# Patient Record
Sex: MALE | Race: WHITE | NOT HISPANIC OR LATINO | URBAN - METROPOLITAN AREA
[De-identification: names, ages, dates, MRNs, and addresses within clinical notes are randomized per-mention and may not be internally consistent; named-entity substitution may affect disease eponyms.]

---

## 2020-07-25 ENCOUNTER — TELEPHONE ENCOUNTER (OUTPATIENT)
Dept: URBAN - METROPOLITAN AREA CLINIC 13 | Facility: CLINIC | Age: 35
End: 2020-07-25

## 2020-07-26 ENCOUNTER — TELEPHONE ENCOUNTER (OUTPATIENT)
Dept: URBAN - METROPOLITAN AREA CLINIC 13 | Facility: CLINIC | Age: 35
End: 2020-07-26

## 2021-02-24 ENCOUNTER — CLAIMS CREATED FROM THE CLAIM WINDOW (OUTPATIENT)
Dept: URBAN - METROPOLITAN AREA MEDICAL CENTER 43 | Facility: MEDICAL CENTER | Age: 36
End: 2021-02-24
Payer: COMMERCIAL

## 2021-02-24 ENCOUNTER — TELEPHONE ENCOUNTER (OUTPATIENT)
Dept: URBAN - METROPOLITAN AREA CLINIC 113 | Facility: CLINIC | Age: 36
End: 2021-02-24

## 2021-02-24 DIAGNOSIS — R13.10 DYSPHAGIA, UNSPECIFIED: ICD-10-CM

## 2021-02-24 DIAGNOSIS — R13.19 CERVICAL DYSPHAGIA: ICD-10-CM

## 2021-02-24 DIAGNOSIS — T18.9XXA FOREIGN BODY OF ALIMENTARY TRACT, PART UNSPECIFIED, INITIAL ENCOUNTER: ICD-10-CM

## 2021-02-24 DIAGNOSIS — M54.2 CERVICALGIA: ICD-10-CM

## 2021-02-24 DIAGNOSIS — T18.128A FOOD IN ESOPHAGUS CAUSING OTHER INJURY, INITIAL ENCOUNTER: ICD-10-CM

## 2021-02-24 DIAGNOSIS — K20.0 EOSINOPHILIC ESOPHAGITIS: ICD-10-CM

## 2021-02-24 DIAGNOSIS — K31.89 ACQUIRED DEFORMITY OF DUODENUM: ICD-10-CM

## 2021-02-24 DIAGNOSIS — K22.2 ESOPHAGEAL OBSTRUCTION: ICD-10-CM

## 2021-02-24 DIAGNOSIS — K20.90 ESOPHAGITIS, UNSPECIFIED WITHOUT BLEEDING: ICD-10-CM

## 2021-02-24 PROCEDURE — 99284 EMERGENCY DEPT VISIT MOD MDM: CPT | Performed by: INTERNAL MEDICINE

## 2021-02-24 PROCEDURE — 43247 EGD REMOVE FOREIGN BODY: CPT | Performed by: INTERNAL MEDICINE

## 2021-03-23 ENCOUNTER — OFFICE VISIT (OUTPATIENT)
Dept: URBAN - METROPOLITAN AREA CLINIC 107 | Facility: CLINIC | Age: 36
End: 2021-03-23

## 2021-04-01 ENCOUNTER — OFFICE VISIT (OUTPATIENT)
Dept: URBAN - METROPOLITAN AREA CLINIC 107 | Facility: CLINIC | Age: 36
End: 2021-04-01
Payer: COMMERCIAL

## 2021-04-01 VITALS
HEART RATE: 62 BPM | RESPIRATION RATE: 18 BRPM | SYSTOLIC BLOOD PRESSURE: 151 MMHG | BODY MASS INDEX: 24.77 KG/M2 | HEIGHT: 74 IN | DIASTOLIC BLOOD PRESSURE: 96 MMHG | WEIGHT: 193 LBS | TEMPERATURE: 98.2 F

## 2021-04-01 DIAGNOSIS — K20.0 EOSINOPHILIC ESOPHAGITIS: ICD-10-CM

## 2021-04-01 DIAGNOSIS — R13.10 ESOPHAGEAL DYSPHAGIA: ICD-10-CM

## 2021-04-01 DIAGNOSIS — K22.2 ESOPHAGEAL OBSTRUCTION: ICD-10-CM

## 2021-04-01 DIAGNOSIS — T18.128A FOOD IN ESOPHAGUS CAUSING OTHER INJURY, INITIAL ENCOUNTER: ICD-10-CM

## 2021-04-01 PROBLEM — 58849003: Status: ACTIVE | Noted: 2021-04-01

## 2021-04-01 PROBLEM — 405247003: Status: ACTIVE | Noted: 2021-04-01

## 2021-04-01 PROCEDURE — 99214 OFFICE O/P EST MOD 30 MIN: CPT | Performed by: INTERNAL MEDICINE

## 2021-04-01 RX ORDER — PANTOPRAZOLE SODIUM 40 MG/1
1 TABLET TABLET, DELAYED RELEASE ORAL TWICE A DAY
Qty: 180 TABLET | Refills: 1

## 2021-04-01 RX ORDER — PANTOPRAZOLE SODIUM 40 MG/1
1 TABLET TABLET, DELAYED RELEASE ORAL TWICE A DAY
Status: ACTIVE | COMMUNITY

## 2021-04-01 NOTE — HPI-TODAY'S VISIT:
Mr. Nolasco is a 35-year-old male here for hospital follow-up after recent food impaction in February.  EGD performed 2/24/21 revealed food in the mid esophagus status post successful removal, mild mid esophageal stenosis and segmental esophagitis, observed only in mild patchy gastric erythema and normal duodenum.  he was producing diagnosed with eosinophilic esophagitis during a prior food impaction in 2019.  He was discharged home on Protonix twice daily.  Overall his dysphagia has improved, but not resolved.  He is very careful with what he eats.  He is not following any elimination diet, but does not eat or drink, seafood, nuts, wheat or soy that often.   he does have occasional postprandial urgency and looser stools.  He denies abdominal pain, chest pain, asthma, nausea, vomiting, change in bowel habits, blood in the stool or weight loss.

## 2021-04-02 ENCOUNTER — TELEPHONE ENCOUNTER (OUTPATIENT)
Dept: URBAN - METROPOLITAN AREA CLINIC 113 | Facility: CLINIC | Age: 36
End: 2021-04-02

## 2021-04-02 RX ORDER — PANTOPRAZOLE SODIUM 40 MG/1
1 TABLET TABLET, DELAYED RELEASE ORAL ONCE A DAY
Qty: 90 TABLET | Refills: 1

## 2021-08-02 ENCOUNTER — LAB OUTSIDE AN ENCOUNTER (OUTPATIENT)
Dept: URBAN - METROPOLITAN AREA CLINIC 107 | Facility: CLINIC | Age: 36
End: 2021-08-02

## 2021-10-27 ENCOUNTER — OFFICE VISIT (OUTPATIENT)
Dept: URBAN - METROPOLITAN AREA CLINIC 107 | Facility: CLINIC | Age: 36
End: 2021-10-27
Payer: COMMERCIAL

## 2021-10-27 ENCOUNTER — WEB ENCOUNTER (OUTPATIENT)
Dept: URBAN - METROPOLITAN AREA CLINIC 107 | Facility: CLINIC | Age: 36
End: 2021-10-27

## 2021-10-27 VITALS
TEMPERATURE: 98.5 F | RESPIRATION RATE: 18 BRPM | SYSTOLIC BLOOD PRESSURE: 155 MMHG | BODY MASS INDEX: 25.41 KG/M2 | WEIGHT: 198 LBS | HEIGHT: 74 IN | DIASTOLIC BLOOD PRESSURE: 102 MMHG | HEART RATE: 80 BPM

## 2021-10-27 DIAGNOSIS — R10.12 LEFT UPPER QUADRANT ABDOMINAL PAIN: ICD-10-CM

## 2021-10-27 DIAGNOSIS — R19.4 CHANGE IN BOWEL HABITS: ICD-10-CM

## 2021-10-27 DIAGNOSIS — R13.10 ESOPHAGEAL DYSPHAGIA: ICD-10-CM

## 2021-10-27 DIAGNOSIS — K21.9 GERD: ICD-10-CM

## 2021-10-27 DIAGNOSIS — K20.0 EOSINOPHILIC ESOPHAGITIS: ICD-10-CM

## 2021-10-27 PROCEDURE — 99214 OFFICE O/P EST MOD 30 MIN: CPT | Performed by: NURSE PRACTITIONER

## 2021-10-27 RX ORDER — DICYCLOMINE HYDROCHLORIDE 10 MG/1
1 CAPSULE CAPSULE ORAL
Qty: 60 | Refills: 1 | OUTPATIENT
Start: 2021-10-27 | End: 2021-12-25

## 2021-10-27 RX ORDER — PANTOPRAZOLE SODIUM 40 MG/1
1 TABLET TABLET, DELAYED RELEASE ORAL TWICE DAILY
Refills: 1 | Status: ACTIVE | COMMUNITY

## 2021-10-27 RX ORDER — PANTOPRAZOLE SODIUM 40 MG/1
1 TABLET TABLET, DELAYED RELEASE ORAL TWICE DAILY
OUTPATIENT

## 2021-10-27 NOTE — HPI-OTHER HISTORIES
EGD performed 2/24/21 revealed food in the mid esophagus status post successful removal, mild mid esophageal stenosis and segmental esophagitis, observed only in mild patchy gastric erythema and normal duodenum.  He was discharged home on Protonix twice daily.  He was previously diagnosed with eosinophilic esophagitis following a food impaction in 2019.

## 2021-10-27 NOTE — HPI-TODAY'S VISIT:
Mr. Nolasco is a 35-year-old male with a history of eosinophilic esophagitis presenting for evaluation of GERD and difficulty swallowing.  He was last seen 4/1/21 for follow-up after hospitalization for a food impaction. He symptoms had improved though not resolved on twice daily Protonix. He was recommended a 6 food elimination diet for EoE, with plan for repeat EGD in 6 months for possible dilation. Within the last 2 to 3 weeks, he has experienced increasing reflux symptoms and difficulty swallowing.  With most meals, food feels like it moves slowly through the esophagus and may become hung in his chest.  He has been following a soft and mostly liquid diet.  He complains of heartburn and indigestion following most oral intake despite twice daily Protonix and has experienced 1 or 2 episodes of regurgitation. He also complains of a more than 6-month history of intermittent left-sided abdominal pain.  He describes the discomfort as sharp in nature, but states it is not related to eating, bowel movements, or activity.  He complains of excessive gas and postprandial stool urgency.  He has 1-4 nonbloody stools per day, but consistency varies.  He is not on a bowel regimen. His mother had colon cancer, diagnosed in her early 50s.

## 2021-11-05 ENCOUNTER — OFFICE VISIT (OUTPATIENT)
Dept: URBAN - METROPOLITAN AREA SURGERY CENTER 25 | Facility: SURGERY CENTER | Age: 36
End: 2021-11-05
Payer: COMMERCIAL

## 2021-11-05 DIAGNOSIS — R10.12 LEFT UPPER QUADRANT ABDOMINAL PAIN: ICD-10-CM

## 2021-11-05 DIAGNOSIS — K22.70 BARRETT ESOPHAGUS: ICD-10-CM

## 2021-11-05 DIAGNOSIS — K20.90 ESOPHAGITIS DETERMINED BY BIOPSY: ICD-10-CM

## 2021-11-05 PROCEDURE — G8907 PT DOC NO EVENTS ON DISCHARG: HCPCS | Performed by: INTERNAL MEDICINE

## 2021-11-05 PROCEDURE — 43239 EGD BIOPSY SINGLE/MULTIPLE: CPT | Performed by: INTERNAL MEDICINE

## 2021-11-05 RX ORDER — PANTOPRAZOLE SODIUM 40 MG/1
1 TABLET TABLET, DELAYED RELEASE ORAL TWICE DAILY
Status: ACTIVE | COMMUNITY

## 2021-11-05 RX ORDER — DICYCLOMINE HYDROCHLORIDE 10 MG/1
1 CAPSULE CAPSULE ORAL
Qty: 60 | Refills: 1 | Status: ACTIVE | COMMUNITY
Start: 2021-10-27 | End: 2021-12-25

## 2021-11-05 RX ORDER — DICYCLOMINE HYDROCHLORIDE 10 MG/1
1 CAPSULE CAPSULE ORAL
Qty: 60 | Refills: 1 | OUTPATIENT

## 2021-11-05 RX ORDER — PANTOPRAZOLE SODIUM 40 MG/1
1 TABLET TABLET, DELAYED RELEASE ORAL TWICE DAILY
OUTPATIENT

## 2021-12-14 ENCOUNTER — ERX REFILL RESPONSE (OUTPATIENT)
Dept: URBAN - METROPOLITAN AREA CLINIC 107 | Facility: CLINIC | Age: 36
End: 2021-12-14

## 2021-12-14 RX ORDER — PANTOPRAZOLE SODIUM 40 MG/1
1 TABLET TABLET, DELAYED RELEASE ORAL TWICE A DAY
Qty: 180 TABLET | Refills: 1 | OUTPATIENT

## 2021-12-14 RX ORDER — PANTOPRAZOLE SODIUM 40 MG/1
TAKE 1 TABLET BY MOUTH TWICE A DAY TABLET, DELAYED RELEASE ORAL
Qty: 60 TABLET | Refills: 6 | OUTPATIENT

## 2021-12-20 ENCOUNTER — OFFICE VISIT (OUTPATIENT)
Dept: URBAN - METROPOLITAN AREA CLINIC 107 | Facility: CLINIC | Age: 36
End: 2021-12-20
Payer: COMMERCIAL

## 2021-12-20 VITALS
WEIGHT: 193 LBS | SYSTOLIC BLOOD PRESSURE: 146 MMHG | BODY MASS INDEX: 24.77 KG/M2 | HEART RATE: 68 BPM | TEMPERATURE: 98.2 F | RESPIRATION RATE: 18 BRPM | DIASTOLIC BLOOD PRESSURE: 95 MMHG | HEIGHT: 74 IN

## 2021-12-20 DIAGNOSIS — K21.9 GERD: ICD-10-CM

## 2021-12-20 DIAGNOSIS — K20.0 EOSINOPHILIC ESOPHAGITIS: ICD-10-CM

## 2021-12-20 DIAGNOSIS — R13.10 ESOPHAGEAL DYSPHAGIA: ICD-10-CM

## 2021-12-20 DIAGNOSIS — R10.12 LEFT UPPER QUADRANT ABDOMINAL PAIN: ICD-10-CM

## 2021-12-20 DIAGNOSIS — K22.70 LONG-SEGMENT BARRETT'S ESOPHAGUS: ICD-10-CM

## 2021-12-20 DIAGNOSIS — R19.4 CHANGE IN BOWEL HABITS: ICD-10-CM

## 2021-12-20 PROBLEM — 235599003: Status: ACTIVE | Noted: 2021-04-01

## 2021-12-20 PROBLEM — 235595009 GASTROESOPHAGEAL REFLUX DISEASE: Status: ACTIVE | Noted: 2021-10-27

## 2021-12-20 PROBLEM — 459491000124104: Status: ACTIVE | Noted: 2021-12-20

## 2021-12-20 PROBLEM — 88111009 CHANGE IN BOWEL HABIT: Status: ACTIVE | Noted: 2021-12-20

## 2021-12-20 PROBLEM — 40890009: Status: ACTIVE | Noted: 2021-04-01

## 2021-12-20 PROBLEM — 301715003 LEFT UPPER QUADRANT PAIN: Status: ACTIVE | Noted: 2021-12-20

## 2021-12-20 PROCEDURE — 99213 OFFICE O/P EST LOW 20 MIN: CPT | Performed by: INTERNAL MEDICINE

## 2021-12-20 RX ORDER — PANTOPRAZOLE SODIUM 40 MG/1
TAKE 1 TABLET BY MOUTH TWICE A DAY TABLET, DELAYED RELEASE ORAL
Qty: 60 TABLET | Refills: 6 | Status: ACTIVE | COMMUNITY

## 2021-12-20 RX ORDER — DICYCLOMINE HYDROCHLORIDE 10 MG/1
1 CAPSULE CAPSULE ORAL
Qty: 60 | Refills: 1 | Status: ON HOLD | COMMUNITY

## 2021-12-20 RX ORDER — PANTOPRAZOLE SODIUM 40 MG/1
1 TABLET TABLET, DELAYED RELEASE ORAL TWICE DAILY
OUTPATIENT

## 2021-12-20 NOTE — HPI-TODAY'S VISIT:
Mr. Nolasco is a 36-year-old male with a history of eosinophilic esophagitis presenting for EGD follow up for evaluation of GERD and difficulty swallowing.  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  Esophageal biopsies in 3 bottles revealed Farley's esophagus, negative for dysplasia and negative for eosinophils in the distal biopsies and the mid to upper esophageal biopsies revealed squamous mucosa with no significant histopathologic changes and specifically no eosinophils.  Overall his dysphasia has resolved and is trying to follow an eosinophil to esophagitis diet.  He is avoiding wheat, soy, milk and eggs.  He is on protonix twice daily.

## 2022-11-19 ENCOUNTER — ERX REFILL RESPONSE (OUTPATIENT)
Dept: URBAN - METROPOLITAN AREA CLINIC 107 | Facility: CLINIC | Age: 37
End: 2022-11-19

## 2022-11-19 RX ORDER — PANTOPRAZOLE SODIUM 40 MG/1
TAKE 1 TABLET BY MOUTH TWICE A DAY TABLET, DELAYED RELEASE ORAL
Qty: 60 TABLET | Refills: 5 | OUTPATIENT

## 2022-11-19 RX ORDER — PANTOPRAZOLE SODIUM 40 MG/1
TAKE 1 TABLET BY MOUTH TWICE A DAY TABLET, DELAYED RELEASE ORAL
Qty: 60 TABLET | Refills: 6 | OUTPATIENT

## 2023-09-27 ENCOUNTER — TELEPHONE ENCOUNTER (OUTPATIENT)
Dept: URBAN - METROPOLITAN AREA CLINIC 107 | Facility: CLINIC | Age: 38
End: 2023-09-27

## 2023-09-27 RX ORDER — PANTOPRAZOLE SODIUM 40 MG/1
TAKE 1 TABLET BY MOUTH TWICE A DAY TABLET, DELAYED RELEASE ORAL
Qty: 60 TABLET | Refills: 2

## 2023-10-23 ENCOUNTER — ERX REFILL RESPONSE (OUTPATIENT)
Dept: URBAN - METROPOLITAN AREA CLINIC 107 | Facility: CLINIC | Age: 38
End: 2023-10-23

## 2023-10-23 RX ORDER — PANTOPRAZOLE SODIUM 40 MG/1
TAKE 1 TABLET BY MOUTH TWICE A DAY TABLET, DELAYED RELEASE ORAL
Qty: 60 TABLET | Refills: 2 | OUTPATIENT

## 2023-10-23 RX ORDER — PANTOPRAZOLE SODIUM 40 MG/1
1 TABLET TABLET, DELAYED RELEASE ORAL ONCE A DAY
Qty: 90 TABLET | Refills: 1 | OUTPATIENT

## 2023-10-26 ENCOUNTER — LAB OUTSIDE AN ENCOUNTER (OUTPATIENT)
Dept: URBAN - METROPOLITAN AREA CLINIC 107 | Facility: CLINIC | Age: 38
End: 2023-10-26

## 2023-10-26 ENCOUNTER — OFFICE VISIT (OUTPATIENT)
Dept: URBAN - METROPOLITAN AREA CLINIC 107 | Facility: CLINIC | Age: 38
End: 2023-10-26
Payer: COMMERCIAL

## 2023-10-26 VITALS
TEMPERATURE: 97.19 F | HEIGHT: 74 IN | DIASTOLIC BLOOD PRESSURE: 98 MMHG | WEIGHT: 206.8 LBS | BODY MASS INDEX: 26.54 KG/M2 | SYSTOLIC BLOOD PRESSURE: 145 MMHG | HEART RATE: 90 BPM

## 2023-10-26 DIAGNOSIS — K21.9 GERD: ICD-10-CM

## 2023-10-26 DIAGNOSIS — K20.0 EOSINOPHILIC ESOPHAGITIS: ICD-10-CM

## 2023-10-26 DIAGNOSIS — R10.13 DYSPEPSIA: ICD-10-CM

## 2023-10-26 DIAGNOSIS — R14.0 ABDOMINAL BLOATING: ICD-10-CM

## 2023-10-26 DIAGNOSIS — K22.70 LONG-SEGMENT BARRETT'S ESOPHAGUS: ICD-10-CM

## 2023-10-26 PROCEDURE — 99214 OFFICE O/P EST MOD 30 MIN: CPT | Performed by: NURSE PRACTITIONER

## 2023-10-26 RX ORDER — PANTOPRAZOLE SODIUM 40 MG/1
1 TABLET TABLET, DELAYED RELEASE ORAL ONCE A DAY
OUTPATIENT

## 2023-10-26 RX ORDER — DICYCLOMINE HYDROCHLORIDE 10 MG/1
1 CAPSULE CAPSULE ORAL
Qty: 60 | Refills: 1 | Status: ON HOLD | COMMUNITY

## 2023-10-26 RX ORDER — GLUCOSAMINE SULFATE 500 MG
1 CAPSULE WITH A MEAL CAPSULE ORAL THREE TIMES A DAY
Status: ACTIVE | COMMUNITY

## 2023-10-26 RX ORDER — LOSARTAN POTASSIUM 50 MG/1
1 TABLET TABLET ORAL ONCE A DAY
Status: ACTIVE | COMMUNITY

## 2023-10-26 RX ORDER — PANTOPRAZOLE SODIUM 40 MG/1
1 TABLET TABLET, DELAYED RELEASE ORAL TWICE DAILY
Refills: 1 | Status: ACTIVE | COMMUNITY

## 2023-10-26 RX ORDER — DEXLANSOPRAZOLE 60 MG/1
1 CAPSULE CAPSULE, DELAYED RELEASE ORAL ONCE A DAY
Qty: 90 | Refills: 3 | OUTPATIENT
Start: 2023-10-26

## 2023-10-26 RX ORDER — MELOXICAM 15 MG/1
1 TABLET TABLET ORAL ONCE A DAY
Status: ACTIVE | COMMUNITY

## 2023-10-26 RX ORDER — ROSUVASTATIN CALCIUM 5 MG/1
1 TABLET TABLET, COATED ORAL ONCE A DAY
Status: ACTIVE | COMMUNITY

## 2023-10-26 NOTE — HPI-TODAY'S VISIT:
Mr. Nolasco is a 37-year-old male with a history of eosinophilic esophagitis, Farley's esophagus, presenting for long-interval follow-up and medication refill. He was last seen in the office in December 2021 for follow-up after EGD, performed for follow-up of eosinophilic esophagitis, prior food impaction, and worsening dysphagia.  This revealed long segment Farley's esophagus without dysplasia and mild changes of eosinophilic esophagitis.  His symptoms had resolved with Protonix twice daily and EOE diet.  He was encouraged to reduce Protonix to once daily after 3 months with plan for repeat EGD in 2 to 3 years for Farley's surveillance. He has remained on pantoprazole twice daily since the time of the last visit with little relief. His insurance is no longer covering twice daily therapy, however, he has symptoms whether he is taking low or high dose pantoprazole. He complains of daily exacerbations of heartburn, acid reflux/indigestion, belching, and bloating, which are worsened after meals. He has been unable to identiy a trigger. He has not experienced any trouble swallowing. He reports a previous trial of omeprazole was ineffective. He started meloxicam about a week ago for knee pain due to fluid and cartilage rubbing.

## 2023-11-30 ENCOUNTER — OFFICE VISIT (OUTPATIENT)
Dept: URBAN - METROPOLITAN AREA CLINIC 107 | Facility: CLINIC | Age: 38
End: 2023-11-30

## 2023-12-01 ENCOUNTER — CLAIMS CREATED FROM THE CLAIM WINDOW (OUTPATIENT)
Dept: URBAN - METROPOLITAN AREA CLINIC 4 | Facility: CLINIC | Age: 38
End: 2023-12-01
Payer: COMMERCIAL

## 2023-12-01 ENCOUNTER — OUT OF OFFICE VISIT (OUTPATIENT)
Dept: URBAN - METROPOLITAN AREA SURGERY CENTER 25 | Facility: SURGERY CENTER | Age: 38
End: 2023-12-01
Payer: COMMERCIAL

## 2023-12-01 DIAGNOSIS — K22.89 OTHER SPECIFIED DISEASES OF ESOPHAGUS: ICD-10-CM

## 2023-12-01 DIAGNOSIS — K22.70 BARRETT'S ESOPHAGUS WITHOUT DYSPLASIA: ICD-10-CM

## 2023-12-01 DIAGNOSIS — K21.9 GASTROESOPHAGEAL REFLUX DISEASE: ICD-10-CM

## 2023-12-01 DIAGNOSIS — K44.9 HIATAL HERNIA: ICD-10-CM

## 2023-12-01 DIAGNOSIS — K21.9 GASTRO-ESOPHAGEAL REFLUX DISEASE WITHOUT ESOPHAGITIS: ICD-10-CM

## 2023-12-01 DIAGNOSIS — K22.89 OTHER SPECIFIED DISEASE OF ESOPHAGUS: ICD-10-CM

## 2023-12-01 DIAGNOSIS — K22.9 IRREGULAR Z LINE OF ESOPHAGUS: ICD-10-CM

## 2023-12-01 PROCEDURE — 43239 EGD BIOPSY SINGLE/MULTIPLE: CPT | Performed by: INTERNAL MEDICINE

## 2023-12-01 PROCEDURE — 88313 SPECIAL STAINS GROUP 2: CPT | Performed by: PATHOLOGY

## 2023-12-01 PROCEDURE — 88312 SPECIAL STAINS GROUP 1: CPT | Performed by: PATHOLOGY

## 2023-12-01 PROCEDURE — G8907 PT DOC NO EVENTS ON DISCHARG: HCPCS | Performed by: INTERNAL MEDICINE

## 2023-12-01 PROCEDURE — 00731 ANES UPR GI NDSC PX NOS: CPT | Performed by: NURSE ANESTHETIST, CERTIFIED REGISTERED

## 2023-12-01 PROCEDURE — 00731 ANES UPR GI NDSC PX NOS: CPT | Performed by: ANESTHESIOLOGY

## 2023-12-01 PROCEDURE — 88305 TISSUE EXAM BY PATHOLOGIST: CPT | Performed by: PATHOLOGY

## 2023-12-01 RX ORDER — DEXLANSOPRAZOLE 60 MG/1
1 CAPSULE CAPSULE, DELAYED RELEASE ORAL ONCE A DAY
Qty: 90 | Refills: 3 | Status: ACTIVE | COMMUNITY
Start: 2023-10-26

## 2023-12-01 RX ORDER — GLUCOSAMINE SULFATE 500 MG
1 CAPSULE WITH A MEAL CAPSULE ORAL THREE TIMES A DAY
Status: ACTIVE | COMMUNITY

## 2023-12-01 RX ORDER — MELOXICAM 15 MG/1
1 TABLET TABLET ORAL ONCE A DAY
Status: ACTIVE | COMMUNITY

## 2023-12-01 RX ORDER — ROSUVASTATIN CALCIUM 5 MG/1
1 TABLET TABLET, COATED ORAL ONCE A DAY
Status: ACTIVE | COMMUNITY

## 2023-12-01 RX ORDER — DICYCLOMINE HYDROCHLORIDE 10 MG/1
1 CAPSULE CAPSULE ORAL
Qty: 60 | Refills: 1 | Status: ON HOLD | COMMUNITY

## 2023-12-01 RX ORDER — LOSARTAN POTASSIUM 50 MG/1
1 TABLET TABLET ORAL ONCE A DAY
Status: ACTIVE | COMMUNITY

## 2024-01-12 ENCOUNTER — OFFICE VISIT (OUTPATIENT)
Dept: URBAN - METROPOLITAN AREA CLINIC 107 | Facility: CLINIC | Age: 39
End: 2024-01-12
Payer: COMMERCIAL

## 2024-01-12 ENCOUNTER — DASHBOARD ENCOUNTERS (OUTPATIENT)
Age: 39
End: 2024-01-12

## 2024-01-12 VITALS
HEIGHT: 74 IN | TEMPERATURE: 97.7 F | HEART RATE: 86 BPM | DIASTOLIC BLOOD PRESSURE: 81 MMHG | BODY MASS INDEX: 25.28 KG/M2 | SYSTOLIC BLOOD PRESSURE: 132 MMHG | WEIGHT: 197 LBS

## 2024-01-12 DIAGNOSIS — R14.0 ABDOMINAL BLOATING: ICD-10-CM

## 2024-01-12 DIAGNOSIS — K20.0 EOSINOPHILIC ESOPHAGITIS: ICD-10-CM

## 2024-01-12 DIAGNOSIS — K22.70 LONG-SEGMENT BARRETT'S ESOPHAGUS: ICD-10-CM

## 2024-01-12 DIAGNOSIS — K21.9 GERD: ICD-10-CM

## 2024-01-12 DIAGNOSIS — R10.13 DYSPEPSIA: ICD-10-CM

## 2024-01-12 PROCEDURE — 99214 OFFICE O/P EST MOD 30 MIN: CPT | Performed by: INTERNAL MEDICINE

## 2024-01-12 RX ORDER — LOSARTAN POTASSIUM 50 MG/1
1 TABLET TABLET ORAL ONCE A DAY
Status: ACTIVE | COMMUNITY

## 2024-01-12 RX ORDER — ROSUVASTATIN CALCIUM 5 MG/1
1 TABLET TABLET, COATED ORAL ONCE A DAY
Status: ACTIVE | COMMUNITY

## 2024-01-12 RX ORDER — DEXLANSOPRAZOLE 60 MG/1
1 CAPSULE CAPSULE, DELAYED RELEASE ORAL ONCE A DAY
Qty: 90 | Refills: 3 | Status: ACTIVE | COMMUNITY
Start: 2023-10-26

## 2024-01-12 RX ORDER — PANTOPRAZOLE SODIUM 40 MG/1
1 TABLET TABLET, DELAYED RELEASE ORAL TWICE A DAY
Qty: 180 TABLET | Refills: 3 | OUTPATIENT

## 2024-01-12 RX ORDER — GLUCOSAMINE SULFATE 500 MG
1 CAPSULE WITH A MEAL CAPSULE ORAL THREE TIMES A DAY
Status: ACTIVE | COMMUNITY

## 2024-01-12 RX ORDER — DICYCLOMINE HYDROCHLORIDE 10 MG/1
1 CAPSULE CAPSULE ORAL
Qty: 60 | Refills: 1 | Status: ON HOLD | COMMUNITY

## 2024-01-12 NOTE — HPI-OTHER HISTORIES
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 Esophageal biopsies in 3 bottles revealed Farley's esophagus, negative for dysplasia and negative for eosinophils in the distal biopsies and the mid to upper esophageal biopsies revealed squamous mucosa with no significant histopathologic changes and specifically no eosinophils. EGD performed 2/24/21 revealed food in the mid esophagus status post successful removal, mild mid esophageal stenosis and segmental esophagitis, observed only in mild patchy gastric erythema and normal duodenum.  He was discharged home on Protonix twice daily.  He was previously diagnosed with eosinophilic esophagitis following a food impaction in 2019.

## 2024-01-12 NOTE — HPI-TODAY'S VISIT:
Mr. Nolasco is a 38-year-old male with a history of possible eosinophilic esophagitis, Farley's esophagus, presenting for GERD and abdominal bloating follow up.  Recent EGD 12/1/2023 revealed long segment Farley's esophagus classified as C5-M5 per [place] criteria, subtle changes were found in the esophagus with subtle rings, no strictures and mild edema, otherwise normal esophagus, medium size hiatal hernia, Hill grade 2 gastroesophageal valve, otherwise normal stomach and duodenum.  Multiple esophageal biopsies revealed reflux type changes, no columnar mucosa and no evidence of eosinophils.  Given the previous findings of Farley's esophagus repeat EGD was recommended in 3 years.  He try the Dexilant for a few weeks without much improvement.  It was also expensive.  He changed back to Protonix twice daily.  His heartburn is better, but he still is complaining of regurgitation and belching often.  He has a lot of abdominal bloating despite dietary changes.  He denies any significant abdominal pain, nausea, vomiting or dysphagia.  He is getting very frustrated because his symptoms are not well-controlled.  He complains of a lot of mucus and sinus drainage.

## 2024-01-15 ENCOUNTER — TELEPHONE ENCOUNTER (OUTPATIENT)
Dept: URBAN - METROPOLITAN AREA CLINIC 113 | Facility: CLINIC | Age: 39
End: 2024-01-15

## 2024-05-15 ENCOUNTER — OFFICE VISIT (OUTPATIENT)
Dept: URBAN - METROPOLITAN AREA CLINIC 107 | Facility: CLINIC | Age: 39
End: 2024-05-15